# Patient Record
Sex: MALE | Race: WHITE | HISPANIC OR LATINO | URBAN - METROPOLITAN AREA
[De-identification: names, ages, dates, MRNs, and addresses within clinical notes are randomized per-mention and may not be internally consistent; named-entity substitution may affect disease eponyms.]

---

## 2022-07-30 ENCOUNTER — EMERGENCY (EMERGENCY)
Facility: HOSPITAL | Age: 72
LOS: 1 days | Discharge: HOME | End: 2022-07-30
Attending: EMERGENCY MEDICINE
Payer: MEDICARE

## 2022-07-30 VITALS
OXYGEN SATURATION: 97 % | DIASTOLIC BLOOD PRESSURE: 105 MMHG | TEMPERATURE: 99 F | HEIGHT: 64 IN | HEART RATE: 76 BPM | WEIGHT: 156.09 LBS | SYSTOLIC BLOOD PRESSURE: 175 MMHG | RESPIRATION RATE: 18 BRPM

## 2022-07-30 RX ADMIN — Medication 500 MILLIGRAM(S): at 06:36

## 2022-07-30 NOTE — ED ADULT TRIAGE NOTE - CHIEF COMPLAINT QUOTE
pt with L foot pain and swelling which he reports started after touching poison ivy yesterday. swelling/redness noted to L great toe. Taking benadryl and Tylenol with minimal relief. ambulated independently.

## 2022-07-30 NOTE — ED PROVIDER NOTE - CLINICAL SUMMARY MEDICAL DECISION MAKING FREE TEXT BOX
left 1st toe pain swell and redness, no prior hx of episodes of gout. atraumatic. arthritic joint on xray. no secondary signs of infection   naproxen and fu with podiatry

## 2022-07-30 NOTE — ED ADULT NURSE NOTE - OBJECTIVE STATEMENT
Aox4. Pt reports left foot swelling after touching poison ivy. Denies n/v/d, sob, chest pain . Respirations even and unlabored.

## 2022-07-30 NOTE — ED PROVIDER NOTE - NS ED ATTENDING STATEMENT MOD
This was a shared visit with the LATISHA. I reviewed and verified the documentation and independently performed the documented:

## 2022-07-30 NOTE — ED PROVIDER NOTE - CARE PLAN
Okay to stop antibiotics. No need for further labs from ID standpoint. Thanks. Principal Discharge DX:	Toe joint pain, left   1

## 2022-07-30 NOTE — ED PROVIDER NOTE - CARE PROVIDER_API CALL
Terrance Bah (DPM)  Podiatric Medicine and Surgery  [address] [place] [address]  Phone: [phone]  Fax: [phone]  Follow Up Time: 1-3 Days

## 2022-07-30 NOTE — ED PROVIDER NOTE - PHYSICAL EXAMINATION
Gen: Well appearing in NAD  Head: NC/AT  Neck: trachea midline  Resp:  No distress  Ext: no deformities  Left 1st toe swelling redness, FROM joint space, ambulatory stable gait. 2+ dp pulse sensation intact   Neuro:  A&O appears non focal  Skin:  Warm and dry as visualized  Psych:  Normal affect and mood

## 2022-07-30 NOTE — ED PROVIDER NOTE - OBJECTIVE STATEMENT
72 yo male reported prostate issues presenting to the ER with left toe pain atraumatic no associated fever chllls rash or numbness   reports no relief with aspirin, tylenol. reports he has had issues with he knees in that past. denies hx of gout. denies hx of piror injuries to the toe

## 2022-07-30 NOTE — ED PROVIDER NOTE - PATIENT PORTAL LINK FT
You can access the FollowMyHealth Patient Portal offered by [facility] by registering at the following website: [email or web address]. By joining [facility]’s FollowMyHealth portal, you will also be able to view your health information using other applications (apps) compatible with our system.